# Patient Record
Sex: FEMALE | Race: WHITE | NOT HISPANIC OR LATINO | Employment: UNEMPLOYED | URBAN - NONMETROPOLITAN AREA
[De-identification: names, ages, dates, MRNs, and addresses within clinical notes are randomized per-mention and may not be internally consistent; named-entity substitution may affect disease eponyms.]

---

## 2017-02-10 ENCOUNTER — HOSPITAL ENCOUNTER (INPATIENT)
Facility: HOSPITAL | Age: 42
LOS: 2 days | Discharge: HOME | End: 2017-02-12
Attending: FAMILY MEDICINE | Admitting: OBSTETRICS & GYNECOLOGY

## 2017-02-10 ENCOUNTER — APPOINTMENT (OUTPATIENT)
Dept: ULTRASOUND IMAGING | Facility: HOSPITAL | Age: 42
End: 2017-02-10

## 2017-02-10 ENCOUNTER — HOSPITAL ENCOUNTER (EMERGENCY)
Facility: HOSPITAL | Age: 42
Discharge: LEFT BEFORE DISCHARGE | End: 2017-02-10
Attending: EMERGENCY MEDICINE | Admitting: NURSE PRACTITIONER

## 2017-02-10 VITALS
SYSTOLIC BLOOD PRESSURE: 135 MMHG | TEMPERATURE: 97.6 F | HEART RATE: 94 BPM | DIASTOLIC BLOOD PRESSURE: 78 MMHG | HEIGHT: 62 IN | RESPIRATION RATE: 16 BRPM | OXYGEN SATURATION: 100 % | BODY MASS INDEX: 24.84 KG/M2 | WEIGHT: 135 LBS

## 2017-02-10 DIAGNOSIS — N93.9 EXCESSIVE VAGINAL BLEEDING: ICD-10-CM

## 2017-02-10 DIAGNOSIS — D62 ACUTE BLOOD LOSS ANEMIA: Primary | ICD-10-CM

## 2017-02-10 DIAGNOSIS — D64.9 ANEMIA, UNSPECIFIED TYPE: Primary | ICD-10-CM

## 2017-02-10 DIAGNOSIS — N92.4 MENORRHAGIA, PREMENOPAUSAL: ICD-10-CM

## 2017-02-10 LAB
ABO GROUP BLD: NORMAL
ALBUMIN SERPL-MCNC: 3.3 G/DL (ref 3.4–4.8)
ALBUMIN/GLOB SERPL: 1.1 G/DL (ref 1.1–1.8)
ALP SERPL-CCNC: 82 U/L (ref 38–126)
ALT SERPL W P-5'-P-CCNC: 26 U/L (ref 9–52)
ANION GAP SERPL CALCULATED.3IONS-SCNC: 7 MMOL/L (ref 5–15)
ANISOCYTOSIS BLD QL: ABNORMAL
APTT PPP: 32.1 SECONDS (ref 20–40.3)
AST SERPL-CCNC: 20 U/L (ref 14–36)
BILIRUB SERPL-MCNC: 0.4 MG/DL (ref 0.2–1.3)
BLD GP AB SCN SERPL QL: NEGATIVE
BUN BLD-MCNC: 11 MG/DL (ref 7–21)
BUN/CREAT SERPL: 20 (ref 7–25)
CALCIUM SPEC-SCNC: 8.6 MG/DL (ref 8.4–10.2)
CHLORIDE SERPL-SCNC: 103 MMOL/L (ref 95–110)
CO2 SERPL-SCNC: 26 MMOL/L (ref 22–31)
CREAT BLD-MCNC: 0.55 MG/DL (ref 0.5–1)
DEPRECATED RDW RBC AUTO: 49 FL (ref 36.4–46.3)
EOSINOPHIL # BLD MANUAL: 0.29 10*3/MM3 (ref 0–0.7)
EOSINOPHIL NFR BLD MANUAL: 3 % (ref 0–7)
ERYTHROCYTE [DISTWIDTH] IN BLOOD BY AUTOMATED COUNT: 17.4 % (ref 11.5–14.5)
GFR SERPL CREATININE-BSD FRML MDRD: 123 ML/MIN/1.73 (ref 60–149)
GLOBULIN UR ELPH-MCNC: 2.9 GM/DL (ref 2.3–3.5)
GLUCOSE BLD-MCNC: 84 MG/DL (ref 60–100)
HCT VFR BLD AUTO: 14.7 % (ref 35–45)
HGB BLD-MCNC: 4.5 G/DL (ref 12–15.5)
HOLD SPECIMEN: NORMAL
HOLD SPECIMEN: NORMAL
HYPOCHROMIA BLD QL: ABNORMAL
LARGE PLATELETS: ABNORMAL
LYMPHOCYTES # BLD MANUAL: 1.74 10*3/MM3 (ref 0.6–4.2)
LYMPHOCYTES NFR BLD MANUAL: 12 % (ref 0–12)
LYMPHOCYTES NFR BLD MANUAL: 18 % (ref 10–50)
Lab: NORMAL
MCH RBC QN AUTO: 23.3 PG (ref 26.5–34)
MCHC RBC AUTO-ENTMCNC: 30.6 G/DL (ref 31.4–36)
MCV RBC AUTO: 76.2 FL (ref 80–98)
MONOCYTES # BLD AUTO: 1.16 10*3/MM3 (ref 0–0.9)
NEUTROPHILS # BLD AUTO: 6.47 10*3/MM3 (ref 2–8.6)
NEUTROPHILS NFR BLD MANUAL: 67 % (ref 37–80)
PLATELET # BLD AUTO: 431 10*3/MM3 (ref 150–450)
PMV BLD AUTO: 7.9 FL (ref 8–12)
POLYCHROMASIA BLD QL SMEAR: ABNORMAL
POTASSIUM BLD-SCNC: 4.2 MMOL/L (ref 3.5–5.1)
PROT SERPL-MCNC: 6.2 G/DL (ref 6.3–8.6)
RBC # BLD AUTO: 1.93 10*6/MM3 (ref 3.77–5.16)
RH BLD: POSITIVE
SMALL PLATELETS BLD QL SMEAR: ADEQUATE
SODIUM BLD-SCNC: 136 MMOL/L (ref 137–145)
WBC MORPH BLD: NORMAL
WBC NRBC COR # BLD: 9.66 10*3/MM3 (ref 3.2–9.8)
WHOLE BLOOD HOLD SPECIMEN: NORMAL

## 2017-02-10 PROCEDURE — 76830 TRANSVAGINAL US NON-OB: CPT

## 2017-02-10 PROCEDURE — 86923 COMPATIBILITY TEST ELECTRIC: CPT

## 2017-02-10 PROCEDURE — 85007 BL SMEAR W/DIFF WBC COUNT: CPT | Performed by: EMERGENCY MEDICINE

## 2017-02-10 PROCEDURE — 86850 RBC ANTIBODY SCREEN: CPT

## 2017-02-10 PROCEDURE — 99284 EMERGENCY DEPT VISIT MOD MDM: CPT

## 2017-02-10 PROCEDURE — 86900 BLOOD TYPING SEROLOGIC ABO: CPT

## 2017-02-10 PROCEDURE — 85025 COMPLETE CBC W/AUTO DIFF WBC: CPT | Performed by: EMERGENCY MEDICINE

## 2017-02-10 PROCEDURE — 36430 TRANSFUSION BLD/BLD COMPNT: CPT

## 2017-02-10 PROCEDURE — 85730 THROMBOPLASTIN TIME PARTIAL: CPT | Performed by: NURSE PRACTITIONER

## 2017-02-10 PROCEDURE — 96374 THER/PROPH/DIAG INJ IV PUSH: CPT

## 2017-02-10 PROCEDURE — 86901 BLOOD TYPING SEROLOGIC RH(D): CPT

## 2017-02-10 PROCEDURE — P9016 RBC LEUKOCYTES REDUCED: HCPCS

## 2017-02-10 PROCEDURE — 80053 COMPREHEN METABOLIC PANEL: CPT | Performed by: NURSE PRACTITIONER

## 2017-02-10 PROCEDURE — 25010000002 ESTROGENS CONJUGATED PER 25 MG: Performed by: FAMILY MEDICINE

## 2017-02-10 RX ORDER — SODIUM CHLORIDE 0.9 % (FLUSH) 0.9 %
10 SYRINGE (ML) INJECTION AS NEEDED
Status: DISCONTINUED | OUTPATIENT
Start: 2017-02-10 | End: 2017-02-10 | Stop reason: HOSPADM

## 2017-02-10 RX ORDER — ACETAMINOPHEN 500 MG
1000 TABLET ORAL ONCE
Status: COMPLETED | OUTPATIENT
Start: 2017-02-10 | End: 2017-02-10

## 2017-02-10 RX ADMIN — CONJUGATED ESTROGENS 25 MG: 25 INJECTION, POWDER, LYOPHILIZED, FOR SOLUTION INTRAMUSCULAR; INTRAVENOUS at 23:59

## 2017-02-10 RX ADMIN — ACETAMINOPHEN 1000 MG: 500 TABLET ORAL at 15:28

## 2017-02-10 RX ADMIN — SODIUM CHLORIDE 1000 ML: 900 INJECTION, SOLUTION INTRAVENOUS at 22:21

## 2017-02-10 NOTE — ED NOTES
Patient reports vaginal bleeding for last 17 days, having dizziness and weakness.     Esha Baker, SOHAN  02/10/17 5029

## 2017-02-10 NOTE — ED PROVIDER NOTES
"Subjective   Patient is a 41 y.o. female presenting with vaginal bleeding.   Vaginal Bleeding   Quality:  Heavier than menses  Severity:  Moderate (states this is 17th day of period. normally period last 5 days.)  Onset quality:  Gradual  Duration:  17 days  Timing:  Constant  Progression:  Waxing and waning  Chronicity:  New  Menstrual history:  Irregular  Number of pads used:  \"several\"  Number of tampons used:  \"several\"  Associated symptoms: fatigue        Review of Systems   Constitutional: Positive for fatigue.   HENT:        Headache   Eyes: Negative.    Respiratory: Negative.    Cardiovascular: Negative.    Gastrointestinal: Negative.    Genitourinary: Positive for vaginal bleeding.   Musculoskeletal: Negative.    Skin: Negative.    Neurological: Negative.    Psychiatric/Behavioral: Negative.        Past Medical History   Diagnosis Date   • Anemia        No Known Allergies    Past Surgical History   Procedure Laterality Date   • Tubal abdominal ligation         History reviewed. No pertinent family history.    Social History     Social History   • Marital status:      Spouse name: N/A   • Number of children: N/A   • Years of education: N/A     Social History Main Topics   • Smoking status: Current Every Day Smoker     Packs/day: 1.00     Types: Cigarettes   • Smokeless tobacco: None   • Alcohol use No   • Drug use: No   • Sexual activity: Not Asked     Other Topics Concern   • None     Social History Narrative           Objective   Physical Exam   Constitutional: She is oriented to person, place, and time. She appears well-developed and well-nourished.   HENT:   Head: Normocephalic.   Eyes: EOM are normal. Pupils are equal, round, and reactive to light.   Conjunctivae pale   Neck: Normal range of motion. Neck supple.   Cardiovascular: Normal rate and regular rhythm.    Pulmonary/Chest: Effort normal and breath sounds normal.   Abdominal: Soft. Bowel sounds are normal.   Neurological: She is alert and " "oriented to person, place, and time.   Skin: Skin is warm and dry. There is pallor.   Nursing note and vitals reviewed.    Visit Vitals   • /78 (BP Location: Left arm, Patient Position: Lying)   • Pulse 94   • Temp 97.6 °F (36.4 °C)   • Resp 16   • Ht 62.25\" (158.1 cm)   • Wt 135 lb (61.2 kg)   • SpO2 100%   • BMI 24.49 kg/m2         Procedures         ED Course  ED Course      Results for orders placed or performed during the hospital encounter of 02/10/17   CBC Auto Differential   Result Value Ref Range    WBC 9.66 3.20 - 9.80 10*3/mm3    RBC 1.93 (L) 3.77 - 5.16 10*6/mm3    Hemoglobin 4.5 (C) 12.0 - 15.5 g/dL    Hematocrit 14.7 (C) 35.0 - 45.0 %    MCV 76.2 (L) 80.0 - 98.0 fL    MCH 23.3 (L) 26.5 - 34.0 pg    MCHC 30.6 (L) 31.4 - 36.0 g/dL    RDW 17.4 (H) 11.5 - 14.5 %    RDW-SD 49.0 (H) 36.4 - 46.3 fl    MPV 7.9 (L) 8.0 - 12.0 fL    Platelets 431 150 - 450 10*3/mm3   Comprehensive Metabolic Panel   Result Value Ref Range    Glucose 84 60 - 100 mg/dL    BUN 11 7 - 21 mg/dL    Creatinine 0.55 0.50 - 1.00 mg/dL    Sodium 136 (L) 137 - 145 mmol/L    Potassium 4.2 3.5 - 5.1 mmol/L    Chloride 103 95 - 110 mmol/L    CO2 26.0 22.0 - 31.0 mmol/L    Calcium 8.6 8.4 - 10.2 mg/dL    Total Protein 6.2 (L) 6.3 - 8.6 g/dL    Albumin 3.30 (L) 3.40 - 4.80 g/dL    ALT (SGPT) 26 9 - 52 U/L    AST (SGOT) 20 14 - 36 U/L    Alkaline Phosphatase 82 38 - 126 U/L    Total Bilirubin 0.4 0.2 - 1.3 mg/dL    eGFR Non African Amer 123 >60 mL/min/1.73    Globulin 2.9 2.3 - 3.5 gm/dL    A/G Ratio 1.1 1.1 - 1.8 g/dL    BUN/Creatinine Ratio 20.0 7.0 - 25.0    Anion Gap 7.0 5.0 - 15.0 mmol/L   aPTT   Result Value Ref Range    PTT 32.1 20.0 - 40.3 seconds   Type & Screen   Result Value Ref Range    ABO Type O     RH type Positive     Antibody Screen Negative    PREVIOUS HISTORY   Result Value Ref Range    Previous History Previous Record on File    Green Top (Gel)   Result Value Ref Range    Extra Tube Hold for add-ons.    Lavender Top "   Result Value Ref Range    Extra Tube hold for add-on    Gold Top - SST   Result Value Ref Range    Extra Tube Hold for add-ons.    Manual Differential   Result Value Ref Range    Neutrophil % 67.0 37.0 - 80.0 %    Lymphocyte % 18.0 10.0 - 50.0 %    Monocyte % 12.0 0.0 - 12.0 %    Eosinophil % 3.0 0.0 - 7.0 %    Neutrophils Absolute 6.47 2.00 - 8.60 10*3/mm3    Lymphocytes Absolute 1.74 0.60 - 4.20 10*3/mm3    Monocytes Absolute 1.16 (H) 0.00 - 0.90 10*3/mm3    Eosinophils Absolute 0.29 0.00 - 0.70 10*3/mm3    Anisocytosis Slight/1+ None Seen    Hypochromia Mod/2+ None Seen    Polychromasia Slight/1+ None Seen    WBC Morphology Normal Normal    Platelet Estimate Adequate Normal    Large Platelets Slight/1+ None Seen                   MDM  Number of Diagnoses or Management Options  Anemia, unspecified type:   Diagnosis management comments: Hgb 4.5. Lab had just redrawn, still waiting the results, When she received phone call. States her kids are being taken from her and she cannot stay. Advised labs are dangerously low. Critical. Refused to stay. Leaving AMA. Advised to return when soon as possible.      Final diagnoses:   Anemia, unspecified type            Tonja Boss, APRN  02/10/17 3042

## 2017-02-11 PROBLEM — N92.4 MENORRHAGIA, PREMENOPAUSAL: Status: ACTIVE | Noted: 2017-02-11

## 2017-02-11 PROBLEM — Z72.0 TOBACCO USE: Status: ACTIVE | Noted: 2017-02-11

## 2017-02-11 LAB
DEPRECATED RDW RBC AUTO: 50 FL (ref 36.4–46.3)
ERYTHROCYTE [DISTWIDTH] IN BLOOD BY AUTOMATED COUNT: 17.1 % (ref 11.5–14.5)
HCT VFR BLD AUTO: 29.5 % (ref 35–45)
HGB BLD-MCNC: 10 G/DL (ref 12–15.5)
MCH RBC QN AUTO: 27.2 PG (ref 26.5–34)
MCHC RBC AUTO-ENTMCNC: 33.9 G/DL (ref 31.4–36)
MCV RBC AUTO: 80.4 FL (ref 80–98)
PLATELET # BLD AUTO: 346 10*3/MM3 (ref 150–450)
PMV BLD AUTO: 8.5 FL (ref 8–12)
RBC # BLD AUTO: 3.67 10*6/MM3 (ref 3.77–5.16)
WBC NRBC COR # BLD: 11.73 10*3/MM3 (ref 3.2–9.8)

## 2017-02-11 PROCEDURE — P9016 RBC LEUKOCYTES REDUCED: HCPCS

## 2017-02-11 PROCEDURE — 86900 BLOOD TYPING SEROLOGIC ABO: CPT

## 2017-02-11 PROCEDURE — 85027 COMPLETE CBC AUTOMATED: CPT | Performed by: OBSTETRICS & GYNECOLOGY

## 2017-02-11 PROCEDURE — 58260 VAGINAL HYSTERECTOMY: CPT | Performed by: OBSTETRICS & GYNECOLOGY

## 2017-02-11 PROCEDURE — 25010000002 DIPHENHYDRAMINE PER 50 MG: Performed by: OBSTETRICS & GYNECOLOGY

## 2017-02-11 PROCEDURE — 25010000002 ESTROGENS CONJUGATED PER 25 MG: Performed by: OBSTETRICS & GYNECOLOGY

## 2017-02-11 PROCEDURE — 25010000002 METOCLOPRAMIDE PER 10 MG: Performed by: OBSTETRICS & GYNECOLOGY

## 2017-02-11 PROCEDURE — 99222 1ST HOSP IP/OBS MODERATE 55: CPT | Performed by: OBSTETRICS & GYNECOLOGY

## 2017-02-11 RX ORDER — HYDROXYZINE PAMOATE 50 MG/1
50 CAPSULE ORAL EVERY 6 HOURS PRN
Status: DISCONTINUED | OUTPATIENT
Start: 2017-02-11 | End: 2017-02-12 | Stop reason: HOSPADM

## 2017-02-11 RX ORDER — FERROUS SULFATE TAB EC 324 MG (65 MG FE EQUIVALENT) 324 (65 FE) MG
65 TABLET DELAYED RESPONSE ORAL
COMMUNITY
End: 2021-05-31

## 2017-02-11 RX ORDER — DEXAMETHASONE SODIUM PHOSPHATE 10 MG/ML
10 INJECTION, SOLUTION INTRAMUSCULAR; INTRAVENOUS ONCE
Status: DISCONTINUED | OUTPATIENT
Start: 2017-02-11 | End: 2017-02-12 | Stop reason: HOSPADM

## 2017-02-11 RX ORDER — METOCLOPRAMIDE HYDROCHLORIDE 5 MG/ML
10 INJECTION INTRAMUSCULAR; INTRAVENOUS ONCE
Status: COMPLETED | OUTPATIENT
Start: 2017-02-11 | End: 2017-02-11

## 2017-02-11 RX ORDER — ACETAMINOPHEN 500 MG
TABLET ORAL
Status: DISCONTINUED
Start: 2017-02-11 | End: 2017-02-11 | Stop reason: WASHOUT

## 2017-02-11 RX ORDER — ACETAMINOPHEN 325 MG/1
TABLET ORAL
Status: COMPLETED
Start: 2017-02-11 | End: 2017-02-11

## 2017-02-11 RX ORDER — DIPHENHYDRAMINE HYDROCHLORIDE 50 MG/ML
25 INJECTION INTRAMUSCULAR; INTRAVENOUS EVERY 6 HOURS PRN
Status: DISCONTINUED | OUTPATIENT
Start: 2017-02-11 | End: 2017-02-12 | Stop reason: HOSPADM

## 2017-02-11 RX ORDER — SODIUM CHLORIDE, SODIUM LACTATE, POTASSIUM CHLORIDE, CALCIUM CHLORIDE 600; 310; 30; 20 MG/100ML; MG/100ML; MG/100ML; MG/100ML
100 INJECTION, SOLUTION INTRAVENOUS CONTINUOUS
Status: DISCONTINUED | OUTPATIENT
Start: 2017-02-11 | End: 2017-02-12 | Stop reason: HOSPADM

## 2017-02-11 RX ORDER — ACETAMINOPHEN 325 MG/1
650 TABLET ORAL EVERY 4 HOURS PRN
Status: DISCONTINUED | OUTPATIENT
Start: 2017-02-11 | End: 2017-02-12 | Stop reason: HOSPADM

## 2017-02-11 RX ADMIN — METOCLOPRAMIDE 10 MG: 5 INJECTION, SOLUTION INTRAMUSCULAR; INTRAVENOUS at 17:05

## 2017-02-11 RX ADMIN — ACETAMINOPHEN 650 MG: 325 TABLET ORAL at 14:52

## 2017-02-11 RX ADMIN — DIPHENHYDRAMINE HYDROCHLORIDE 25 MG: 50 INJECTION INTRAMUSCULAR; INTRAVENOUS at 17:05

## 2017-02-11 RX ADMIN — CONJUGATED ESTROGENS 25 MG: 25 INJECTION, POWDER, LYOPHILIZED, FOR SOLUTION INTRAMUSCULAR; INTRAVENOUS at 13:39

## 2017-02-11 RX ADMIN — SODIUM CHLORIDE, POTASSIUM CHLORIDE, SODIUM LACTATE AND CALCIUM CHLORIDE 100 ML/HR: 600; 310; 30; 20 INJECTION, SOLUTION INTRAVENOUS at 23:30

## 2017-02-11 RX ADMIN — ACETAMINOPHEN 650 MG: 325 TABLET ORAL at 07:25

## 2017-02-11 RX ADMIN — HYDROXYZINE PAMOATE 50 MG: 50 CAPSULE ORAL at 15:26

## 2017-02-11 NOTE — H&P
Patient Care Team:  No Known Provider as PCP - General    Chief complaint HEAVY VAGINAL BLEEDING    Subjective     Patient is a 41 y.o. female  presents with a heavy period and feeling weak and dizzy.  She normally has regular menstrual periods but skipped a period in December.  When her period started in late January, it wouldn't stop.  She presented to the ED with these symptoms and was found to have a severe anemia with a hemoglobin of 4.5.  She was given a dose of IV premarin and her bleeding is lighter.  She is currently receiving the third unit of blood.  She is feeling better and is asking about going home.               History  Past Medical History   Diagnosis Date   • Anemia      Past Surgical History   Procedure Laterality Date   • Tubal abdominal ligation       History reviewed. No pertinent family history.  Social History   Substance Use Topics   • Smoking status: Current Every Day Smoker     Packs/day: 1.00     Types: Cigarettes   • Smokeless tobacco: None   • Alcohol use No     Prescriptions Prior to Admission   Medication Sig Dispense Refill Last Dose   • ferrous sulfate 324 (65 FE) MG tablet delayed-release EC tablet Take 65 mg by mouth Daily With Breakfast.   2/10/2017 at Unknown time     Allergies:  Review of patient's allergies indicates no known allergies.    Review of Systems - General ROS: positive for  - fatigue and sleep disturbance  Psychological ROS: negative  Ophthalmic ROS: negative for - blurry vision  ENT ROS: negative for - nasal congestion or sore throat  Allergy and Immunology ROS: negative for - itchy/watery eyes  Hematological and Lymphatic ROS: positive for - bleeding problems and pallor  Endocrine ROS: positive for - malaise/lethargy and palpitations  Breast ROS: negative for breast lumps  Respiratory ROS: positive for - shortness of breath  Cardiovascular ROS: no chest pain or dyspnea on exertion  Gastrointestinal ROS: no abdominal pain, change in bowel habits, or black  or bloody stools  Genito-Urinary ROS: no dysuria, trouble voiding, or hematuria  Musculoskeletal ROS: negative for - gait disturbance  Neurological ROS: no TIA or stroke symptoms  Dermatological ROS: negative for rash    Objective     Vital Signs  Temp:  [97.6 °F (36.4 °C)-98.6 °F (37 °C)] 98.6 °F (37 °C)  Heart Rate:  [] 85  Resp:  [16-20] 20  BP: (111-163)/(62-98) 144/85    Physical Exam:      General Appearance:    Alert, cooperative, in no acute distress, pale in appearance   Head:    Normocephalic, without obvious abnormality, atraumatic   Eyes:            Lids and lashes normal, conjunctivae and sclerae normal, no   icterus, corneas clear, PERRLA   Ears:    Ears appear intact with no abnormalities noted   Throat:   No oral lesions, no thrush, oral mucosa moist   Neck:   No adenopathy, supple, trachea midline, no thyromegaly, no     carotid bruit, no JVD   Back:     No kyphosis present, no scoliosis present, no skin lesions,       erythema or scars, no tenderness to percussion or                   palpation,   range of motion normal   Lungs:     Clear to auscultation,respirations regular, even and                   unlabored    Heart:    Regular rhythm and normal rate, normal S1 and S2, no            murmur, no gallop, no rub, no click   Breast Exam:    Deferred   Abdomen:     Normal bowel sounds, no masses, no organomegaly, soft        non-tender, non-distended, no guarding, no rebound                 tenderness   Genitalia:    Deferred   Extremities:   Moves all extremities well, no edema, no cyanosis, no              redness   Pulses:   Pulses palpable and equal bilaterally   Skin:   No bleeding, bruising or rash   Lymph nodes:   No palpable adenopathy   Neurologic:   Cranial nerves 2 - 12 grossly intact       Results Review:    I reviewed the patient's new clinical results.    Assessment/Plan     Active Problems:    Acute blood loss anemia    Menorrhagia, premenopausal    Tobacco use      I discussed  the patients findings and my recommendations with patient.   We have discussed the severity of her situation and that discharge home is not a reasonable request.  She will agree to finishing the transfusion.  We will repeat the premarin and likely convert her over to oral estrogen/progesterone until we can proceed with something more permanent.  She is asking about a hysterectomy but at this point, as she is stable, I would recommend improving the hemoglobin and stopping the bleeding before proceeding.  She is agreeable to that.    Tish Dan MD  02/11/17  8:40 AM    Time: 45 minutes

## 2017-02-11 NOTE — PROGRESS NOTES
Fadia and I discussed options for her significant anemia and menorrhagia and she wants to proceed with hysterectomy.  As she has had 4 vaginal deliveries, would recommend a vaginal hysterectomy.  Her uterus is enlarged but I feel that she is a good candidate.    Today I discussed with Fadia the risks of her upcoming surgical procedure. Risks including intraoperative bleeding, infection at the site of surgery, damage to the surrounding organs, catheter introduced urinary tract infections and the small risk for deep vein thrombosis were all explained. Additionally, the small risk for reoperation in the event of unanticipated bleeding or surgical injury was discussed.  All of her questions were answered fully.  She has very clear understanding of the preoperative surgical indications and the nature of the surgery for which she is scheduled.  She is also aware of the possibility of blood transfusion.

## 2017-02-11 NOTE — ED PROVIDER NOTES
"Subjective   Patient is a 41 y.o. female presenting with vaginal bleeding.   Vaginal Bleeding   Quality:  Bright red  Severity:  Moderate  Onset quality:  Sudden  Duration:  2 weeks  Timing:  Intermittent  Progression:  Worsening  Chronicity:  New  Menstrual history:  Irregular  Number of tampons used:  3 / day   Possible pregnancy: no    Context: not after intercourse, not after urination, not at rest and not genital trauma    Relieved by:  Nothing  Ineffective treatments:  None tried  Associated symptoms: fatigue    Associated symptoms: no abdominal pain, no back pain, no dizziness, no dyspareunia, no dysuria, no fever, no nausea and no vaginal discharge    Risk factors: ovarian cysts    Risk factors: no bleeding disorder        Review of Systems   Constitutional: Positive for fatigue. Negative for fever.   Gastrointestinal: Negative for abdominal pain and nausea.   Genitourinary: Positive for vaginal bleeding. Negative for dyspareunia, dysuria and vaginal discharge.   Musculoskeletal: Negative for back pain.   Neurological: Negative for dizziness.       Past Medical History   Diagnosis Date   • Anemia        No Known Allergies    Past Surgical History   Procedure Laterality Date   • Tubal abdominal ligation         No family history on file.    Social History     Social History   • Marital status:      Spouse name: N/A   • Number of children: N/A   • Years of education: N/A     Social History Main Topics   • Smoking status: Current Every Day Smoker     Packs/day: 1.00     Types: Cigarettes   • Smokeless tobacco: None   • Alcohol use No   • Drug use: No   • Sexual activity: Not Asked     Other Topics Concern   • None     Social History Narrative           Objective    Visit Vitals   • /74 (BP Location: Left arm, Patient Position: Lying)   • Pulse 98   • Temp 98.4 °F (36.9 °C) (Oral)   • Resp 18   • Ht 62\" (157.5 cm)   • Wt 135 lb (61.2 kg)   • SpO2 99%   • BMI 24.69 kg/m2       Physical Exam "   Constitutional: She is oriented to person, place, and time. She appears well-developed and well-nourished.   HENT:   Head: Normocephalic and atraumatic.   Right Ear: External ear normal.   Left Ear: External ear normal.   Nose: Nose normal.   Mouth/Throat: Oropharynx is clear and moist.   Eyes: Conjunctivae and EOM are normal. Pupils are equal, round, and reactive to light.   Neck: Normal range of motion. Neck supple.   Cardiovascular: Normal rate, regular rhythm, normal heart sounds and intact distal pulses.    Pulmonary/Chest: Effort normal and breath sounds normal. No accessory muscle usage. No respiratory distress. She exhibits no tenderness and no deformity.   Abdominal: Soft. Bowel sounds are normal. There is no tenderness.   Musculoskeletal: Normal range of motion.   Neurological: She is alert and oriented to person, place, and time. She has normal reflexes.   Skin: Skin is warm.   Psychiatric: She has a normal mood and affect. Her behavior is normal. Judgment and thought content normal.   Nursing note and vitals reviewed.      Procedures         ED Course  ED Course      Labs Reviewed   PREGNANCY, URINE   URINALYSIS W/ CULTURE IF INDICATED   PREPARE RBC   PREPARE RBC     Us Non-ob Transvaginal    Result Date: 2/11/2017  Narrative: [facility] [place] Patient Name: EPIFANIO BECKER ORDERING: PRASANNA NORTON ATTENDING: PRASANNA NORTON REFERRING: PRASANNA NORTON----------------------- PROCEDURE: Ultrasound pelvis transvaginal on 2/11/2017 CLINICAL INDICATION:  Heavy vaginal bleeding COMPARISON: None FINDINGS: Multiple sonographic images are obtained throughout the pelvis by transvaginal approach, both transverse and sagittal images are obtained. Uterus measures approximately 9.6 x 4.9 x 6.2 cm. Very small amount of fluid is noted in the endometrial canal. Tiny nabothian cyst is noted in the cervix. There is a 3.4 x 2.8 x 3.7 cm simple left ovarian cyst. This should be considered almost certainly  benign with no follow-up recommended. Left ovary measures approximately 4.2 x 3.1 x 4.4 cm. Flow is demonstrated in the left ovary. There are two adjacent dominant follicles in the right ovary with the largest measuring 2.4 cm. This also should be considered benign with no follow-up recommended. Right ovary measures approximately 3.3 x 2.9 x 2.3 cm. No adnexal mass or fluid collection is noted. No free fluid is noted. Endometrial stripe measures approximately 8 mm which is within normal limits. Uterine myometrium appears homogeneous.     Impression: CONCLUSION: Essentially unremarkable exam. Electronically signed by:  Marcus Escalera  2/11/2017 12:40 AM CST Workstation: [id number]      Severe symptomatic anemia after vaginal bleeding .   Risk and benefits discussed with pt and orders initiated transfusion .             MDM  Number of Diagnoses or Management Options  Acute blood loss anemia: new and requires workup  Excessive vaginal bleeding: new and requires workup     Amount and/or Complexity of Data Reviewed  Clinical lab tests: ordered and reviewed  Tests in the medicine section of CPT®: ordered and reviewed  Review and summarize past medical records: yes  Discuss the patient with other providers: yes    Risk of Complications, Morbidity, and/or Mortality  Presenting problems: moderate  Management options: high  General comments: Acute blood loss anemia - transfusion ordered.         Final diagnoses:   Acute blood loss anemia   Excessive vaginal bleeding            Lizzie Burnett MD  02/11/17 0155

## 2017-02-11 NOTE — PLAN OF CARE
Problem: Patient Care Overview (Adult)  Goal: Plan of Care Review  Outcome: Ongoing (interventions implemented as appropriate)    02/11/17 0311   Coping/Psychosocial Response Interventions   Plan Of Care Reviewed With patient   Patient Care Overview   Progress no change       Goal: Adult Individualization and Mutuality  Outcome: Ongoing (interventions implemented as appropriate)  Goal: Discharge Needs Assessment  Outcome: Ongoing (interventions implemented as appropriate)    Problem: Fluid Volume Deficit (Adult)  Goal: Identify Related Risk Factors and Signs and Symptoms  Outcome: Ongoing (interventions implemented as appropriate)  Goal: Fluid/Electrolyte Balance  Outcome: Ongoing (interventions implemented as appropriate)  Goal: Comfort/Well Being  Outcome: Ongoing (interventions implemented as appropriate)

## 2017-02-11 NOTE — PLAN OF CARE
Problem: Patient Care Overview (Adult)  Goal: Plan of Care Review  Outcome: Ongoing (interventions implemented as appropriate)    02/11/17 1747   Coping/Psychosocial Response Interventions   Plan Of Care Reviewed With patient   Outcome Evaluation   Outcome Summary/Follow up Plan surgery in a/m per nikhil Vásquez at 2300 sa       02/11/17 1747   Coping/Psychosocial Response Interventions   Plan Of Care Reviewed With patient   Outcome Evaluation   Outcome Summary/Follow up Plan surgery in a/m per nikhil Vásquez at 2300 saline lock   line lock       Goal: Discharge Needs Assessment  Outcome: Ongoing (interventions implemented as appropriate)    Problem: Fluid Volume Deficit (Adult)  Goal: Fluid/Electrolyte Balance  Outcome: Ongoing (interventions implemented as appropriate)  Goal: Comfort/Well Being  Outcome: Ongoing (interventions implemented as appropriate)

## 2017-02-12 ENCOUNTER — ANESTHESIA EVENT (OUTPATIENT)
Dept: PERIOP | Facility: HOSPITAL | Age: 42
End: 2017-02-12

## 2017-02-12 ENCOUNTER — ANESTHESIA (OUTPATIENT)
Dept: PERIOP | Facility: HOSPITAL | Age: 42
End: 2017-02-12

## 2017-02-12 VITALS
OXYGEN SATURATION: 98 % | WEIGHT: 135 LBS | HEART RATE: 82 BPM | BODY MASS INDEX: 24.84 KG/M2 | DIASTOLIC BLOOD PRESSURE: 99 MMHG | RESPIRATION RATE: 20 BRPM | SYSTOLIC BLOOD PRESSURE: 148 MMHG | TEMPERATURE: 97.9 F | HEIGHT: 62 IN

## 2017-02-12 PROBLEM — N92.4 MENORRHAGIA, PREMENOPAUSAL: Status: RESOLVED | Noted: 2017-02-11 | Resolved: 2017-02-12

## 2017-02-12 LAB
ABO + RH BLD: NORMAL
BH BB BLOOD EXPIRATION DATE: NORMAL
BH BB BLOOD TYPE BARCODE: 5100
BH BB DISPENSE STATUS: NORMAL
BH BB PRODUCT CODE: NORMAL
BH BB UNIT NUMBER: NORMAL
HCT VFR BLD AUTO: 28.2 % (ref 35–45)
HGB BLD-MCNC: 9.4 G/DL (ref 12–15.5)
UNIT  ABO: NORMAL
UNIT  RH: NORMAL

## 2017-02-12 PROCEDURE — 25010000002 ONDANSETRON PER 1 MG: Performed by: ANESTHESIOLOGY

## 2017-02-12 PROCEDURE — 25010000002 MIDAZOLAM PER 1 MG: Performed by: ANESTHESIOLOGY

## 2017-02-12 PROCEDURE — 25010000002 HYDROMORPHONE PER 4 MG

## 2017-02-12 PROCEDURE — 25010000002 HYDROMORPHONE PER 4 MG: Performed by: ANESTHESIOLOGY

## 2017-02-12 PROCEDURE — 25010000003 CEFAZOLIN PER 500 MG: Performed by: ANESTHESIOLOGY

## 2017-02-12 PROCEDURE — 85014 HEMATOCRIT: CPT | Performed by: OBSTETRICS & GYNECOLOGY

## 2017-02-12 PROCEDURE — 88307 TISSUE EXAM BY PATHOLOGIST: CPT | Performed by: OBSTETRICS & GYNECOLOGY

## 2017-02-12 PROCEDURE — 25010000002 PROMETHAZINE PER 50 MG: Performed by: OBSTETRICS & GYNECOLOGY

## 2017-02-12 PROCEDURE — 25010000002 FENTANYL CITRATE (PF) 100 MCG/2ML SOLUTION: Performed by: ANESTHESIOLOGY

## 2017-02-12 PROCEDURE — 99024 POSTOP FOLLOW-UP VISIT: CPT | Performed by: OBSTETRICS & GYNECOLOGY

## 2017-02-12 PROCEDURE — 0UT97ZZ RESECTION OF UTERUS, VIA NATURAL OR ARTIFICIAL OPENING: ICD-10-PCS | Performed by: OBSTETRICS & GYNECOLOGY

## 2017-02-12 PROCEDURE — 25010000002 PROPOFOL 10 MG/ML EMULSION: Performed by: ANESTHESIOLOGY

## 2017-02-12 PROCEDURE — 85018 HEMOGLOBIN: CPT | Performed by: OBSTETRICS & GYNECOLOGY

## 2017-02-12 PROCEDURE — 0UTC7ZZ RESECTION OF CERVIX, VIA NATURAL OR ARTIFICIAL OPENING: ICD-10-PCS | Performed by: OBSTETRICS & GYNECOLOGY

## 2017-02-12 RX ORDER — NALOXONE HCL 0.4 MG/ML
0.2 VIAL (ML) INJECTION AS NEEDED
Status: DISCONTINUED | OUTPATIENT
Start: 2017-02-12 | End: 2017-02-12 | Stop reason: HOSPADM

## 2017-02-12 RX ORDER — ACETAMINOPHEN 325 MG/1
650 TABLET ORAL ONCE AS NEEDED
Status: DISCONTINUED | OUTPATIENT
Start: 2017-02-12 | End: 2017-02-12 | Stop reason: HOSPADM

## 2017-02-12 RX ORDER — HYDROCODONE BITARTRATE AND ACETAMINOPHEN 7.5; 325 MG/1; MG/1
1 TABLET ORAL EVERY 4 HOURS PRN
Qty: 40 TABLET | Refills: 0 | Status: SHIPPED | OUTPATIENT
Start: 2017-02-12 | End: 2017-02-22

## 2017-02-12 RX ORDER — ONDANSETRON 4 MG/1
4 TABLET, FILM COATED ORAL EVERY 6 HOURS PRN
Status: DISCONTINUED | OUTPATIENT
Start: 2017-02-12 | End: 2017-02-12 | Stop reason: HOSPADM

## 2017-02-12 RX ORDER — ONDANSETRON 2 MG/ML
INJECTION INTRAMUSCULAR; INTRAVENOUS AS NEEDED
Status: DISCONTINUED | OUTPATIENT
Start: 2017-02-12 | End: 2017-02-12 | Stop reason: SURG

## 2017-02-12 RX ORDER — FLUMAZENIL 0.1 MG/ML
0.2 INJECTION INTRAVENOUS AS NEEDED
Status: DISCONTINUED | OUTPATIENT
Start: 2017-02-12 | End: 2017-02-12 | Stop reason: HOSPADM

## 2017-02-12 RX ORDER — PROMETHAZINE HYDROCHLORIDE 25 MG/ML
12.5 INJECTION, SOLUTION INTRAMUSCULAR; INTRAVENOUS EVERY 6 HOURS PRN
Status: DISCONTINUED | OUTPATIENT
Start: 2017-02-12 | End: 2017-02-12 | Stop reason: HOSPADM

## 2017-02-12 RX ORDER — SODIUM CHLORIDE, SODIUM GLUCONATE, SODIUM ACETATE, POTASSIUM CHLORIDE, AND MAGNESIUM CHLORIDE 526; 502; 368; 37; 30 MG/100ML; MG/100ML; MG/100ML; MG/100ML; MG/100ML
INJECTION, SOLUTION INTRAVENOUS CONTINUOUS PRN
Status: DISCONTINUED | OUTPATIENT
Start: 2017-02-12 | End: 2017-02-12 | Stop reason: SURG

## 2017-02-12 RX ORDER — MIDAZOLAM HYDROCHLORIDE 1 MG/ML
INJECTION INTRAMUSCULAR; INTRAVENOUS AS NEEDED
Status: DISCONTINUED | OUTPATIENT
Start: 2017-02-12 | End: 2017-02-12 | Stop reason: SURG

## 2017-02-12 RX ORDER — CEFAZOLIN SODIUM 1 G/3ML
INJECTION, POWDER, FOR SOLUTION INTRAMUSCULAR; INTRAVENOUS AS NEEDED
Status: DISCONTINUED | OUTPATIENT
Start: 2017-02-12 | End: 2017-02-12 | Stop reason: SURG

## 2017-02-12 RX ORDER — ONDANSETRON 2 MG/ML
4 INJECTION INTRAMUSCULAR; INTRAVENOUS EVERY 6 HOURS PRN
Status: DISCONTINUED | OUTPATIENT
Start: 2017-02-12 | End: 2017-02-12 | Stop reason: SDUPTHER

## 2017-02-12 RX ORDER — HYDROCODONE BITARTRATE AND ACETAMINOPHEN 7.5; 325 MG/1; MG/1
1 TABLET ORAL EVERY 4 HOURS PRN
Status: DISCONTINUED | OUTPATIENT
Start: 2017-02-12 | End: 2017-02-12 | Stop reason: HOSPADM

## 2017-02-12 RX ORDER — FENTANYL CITRATE 50 UG/ML
INJECTION, SOLUTION INTRAMUSCULAR; INTRAVENOUS AS NEEDED
Status: DISCONTINUED | OUTPATIENT
Start: 2017-02-12 | End: 2017-02-12 | Stop reason: SURG

## 2017-02-12 RX ORDER — BUPIVACAINE HYDROCHLORIDE AND EPINEPHRINE 5; 5 MG/ML; UG/ML
INJECTION, SOLUTION EPIDURAL; INTRACAUDAL; PERINEURAL AS NEEDED
Status: DISCONTINUED | OUTPATIENT
Start: 2017-02-12 | End: 2017-02-12 | Stop reason: HOSPADM

## 2017-02-12 RX ORDER — ACETAMINOPHEN 650 MG/1
650 SUPPOSITORY RECTAL ONCE AS NEEDED
Status: DISCONTINUED | OUTPATIENT
Start: 2017-02-12 | End: 2017-02-12 | Stop reason: HOSPADM

## 2017-02-12 RX ORDER — ROCURONIUM BROMIDE 10 MG/ML
INJECTION, SOLUTION INTRAVENOUS AS NEEDED
Status: DISCONTINUED | OUTPATIENT
Start: 2017-02-12 | End: 2017-02-12 | Stop reason: SURG

## 2017-02-12 RX ORDER — ONDANSETRON 2 MG/ML
4 INJECTION INTRAMUSCULAR; INTRAVENOUS ONCE AS NEEDED
Status: DISCONTINUED | OUTPATIENT
Start: 2017-02-12 | End: 2017-02-12 | Stop reason: HOSPADM

## 2017-02-12 RX ORDER — BUPIVACAINE HYDROCHLORIDE AND EPINEPHRINE 5; 5 MG/ML; UG/ML
INJECTION, SOLUTION EPIDURAL; INTRACAUDAL; PERINEURAL
Status: DISCONTINUED
Start: 2017-02-12 | End: 2017-02-12 | Stop reason: HOSPADM

## 2017-02-12 RX ORDER — LABETALOL HYDROCHLORIDE 5 MG/ML
5 INJECTION, SOLUTION INTRAVENOUS
Status: DISCONTINUED | OUTPATIENT
Start: 2017-02-12 | End: 2017-02-12 | Stop reason: HOSPADM

## 2017-02-12 RX ORDER — DIPHENHYDRAMINE HYDROCHLORIDE 50 MG/ML
12.5 INJECTION INTRAMUSCULAR; INTRAVENOUS
Status: DISCONTINUED | OUTPATIENT
Start: 2017-02-12 | End: 2017-02-12 | Stop reason: HOSPADM

## 2017-02-12 RX ORDER — HYDROXYZINE PAMOATE 50 MG/1
50 CAPSULE ORAL EVERY 6 HOURS PRN
Qty: 20 CAPSULE | Refills: 0 | OUTPATIENT
Start: 2017-02-12 | End: 2021-05-31

## 2017-02-12 RX ORDER — HYDRALAZINE HYDROCHLORIDE 20 MG/ML
5 INJECTION INTRAMUSCULAR; INTRAVENOUS
Status: DISCONTINUED | OUTPATIENT
Start: 2017-02-12 | End: 2017-02-12 | Stop reason: HOSPADM

## 2017-02-12 RX ORDER — METOCLOPRAMIDE HYDROCHLORIDE 5 MG/ML
10 INJECTION INTRAMUSCULAR; INTRAVENOUS ONCE AS NEEDED
Status: DISCONTINUED | OUTPATIENT
Start: 2017-02-12 | End: 2017-02-12 | Stop reason: HOSPADM

## 2017-02-12 RX ORDER — LIDOCAINE HYDROCHLORIDE 20 MG/ML
INJECTION, SOLUTION INFILTRATION; PERINEURAL AS NEEDED
Status: DISCONTINUED | OUTPATIENT
Start: 2017-02-12 | End: 2017-02-12 | Stop reason: SURG

## 2017-02-12 RX ORDER — PROPOFOL 10 MG/ML
VIAL (ML) INTRAVENOUS AS NEEDED
Status: DISCONTINUED | OUTPATIENT
Start: 2017-02-12 | End: 2017-02-12 | Stop reason: SURG

## 2017-02-12 RX ORDER — SODIUM CHLORIDE, SODIUM LACTATE, POTASSIUM CHLORIDE, CALCIUM CHLORIDE 600; 310; 30; 20 MG/100ML; MG/100ML; MG/100ML; MG/100ML
100 INJECTION, SOLUTION INTRAVENOUS CONTINUOUS
Status: DISCONTINUED | OUTPATIENT
Start: 2017-02-12 | End: 2017-02-12 | Stop reason: HOSPADM

## 2017-02-12 RX ORDER — ONDANSETRON 4 MG/1
4 TABLET, ORALLY DISINTEGRATING ORAL EVERY 6 HOURS PRN
Status: DISCONTINUED | OUTPATIENT
Start: 2017-02-12 | End: 2017-02-12 | Stop reason: HOSPADM

## 2017-02-12 RX ADMIN — HYDROMORPHONE HYDROCHLORIDE 0.5 MG: 1 INJECTION, SOLUTION INTRAMUSCULAR; INTRAVENOUS; SUBCUTANEOUS at 09:35

## 2017-02-12 RX ADMIN — HYDROMORPHONE HYDROCHLORIDE 0.5 MG: 1 INJECTION, SOLUTION INTRAMUSCULAR; INTRAVENOUS; SUBCUTANEOUS at 11:28

## 2017-02-12 RX ADMIN — HYDROCODONE BITARTRATE AND ACETAMINOPHEN 1 TABLET: 7.5; 325 TABLET ORAL at 11:09

## 2017-02-12 RX ADMIN — LIDOCAINE HYDROCHLORIDE 60 MG: 20 INJECTION, SOLUTION INFILTRATION; PERINEURAL at 07:19

## 2017-02-12 RX ADMIN — SODIUM CHLORIDE, SODIUM GLUCONATE, SODIUM ACETATE, POTASSIUM CHLORIDE, AND MAGNESIUM CHLORIDE: 526; 502; 368; 37; 30 INJECTION, SOLUTION INTRAVENOUS at 08:45

## 2017-02-12 RX ADMIN — PROPOFOL 150 MG: 10 INJECTION, EMULSION INTRAVENOUS at 07:19

## 2017-02-12 RX ADMIN — ACETAMINOPHEN 650 MG: 325 TABLET ORAL at 03:04

## 2017-02-12 RX ADMIN — FENTANYL CITRATE 100 MCG: 50 INJECTION, SOLUTION INTRAMUSCULAR; INTRAVENOUS at 07:25

## 2017-02-12 RX ADMIN — ROCURONIUM BROMIDE 30 MG: 10 INJECTION INTRAVENOUS at 07:19

## 2017-02-12 RX ADMIN — SODIUM CHLORIDE, SODIUM GLUCONATE, SODIUM ACETATE, POTASSIUM CHLORIDE, AND MAGNESIUM CHLORIDE: 526; 502; 368; 37; 30 INJECTION, SOLUTION INTRAVENOUS at 07:00

## 2017-02-12 RX ADMIN — Medication 0.5 MG: at 11:28

## 2017-02-12 RX ADMIN — PROMETHAZINE HYDROCHLORIDE 12.5 MG: 25 INJECTION INTRAMUSCULAR; INTRAVENOUS at 13:38

## 2017-02-12 RX ADMIN — FENTANYL CITRATE 50 MCG: 50 INJECTION, SOLUTION INTRAMUSCULAR; INTRAVENOUS at 07:19

## 2017-02-12 RX ADMIN — HYDROCODONE BITARTRATE AND ACETAMINOPHEN 1 TABLET: 7.5; 325 TABLET ORAL at 15:18

## 2017-02-12 RX ADMIN — HYDROMORPHONE HYDROCHLORIDE 0.5 MG: 1 INJECTION, SOLUTION INTRAMUSCULAR; INTRAVENOUS; SUBCUTANEOUS at 09:45

## 2017-02-12 RX ADMIN — ONDANSETRON 4 MG: 2 INJECTION INTRAMUSCULAR; INTRAVENOUS at 07:15

## 2017-02-12 RX ADMIN — CEFAZOLIN SODIUM 2 G: 1 INJECTION, POWDER, FOR SOLUTION INTRAMUSCULAR; INTRAVENOUS at 07:20

## 2017-02-12 RX ADMIN — MIDAZOLAM 2 MG: 1 INJECTION INTRAMUSCULAR; INTRAVENOUS at 07:14

## 2017-02-12 RX ADMIN — FENTANYL CITRATE 50 MCG: 50 INJECTION, SOLUTION INTRAMUSCULAR; INTRAVENOUS at 08:15

## 2017-02-12 RX ADMIN — FENTANYL CITRATE 50 MCG: 50 INJECTION, SOLUTION INTRAMUSCULAR; INTRAVENOUS at 08:20

## 2017-02-12 NOTE — ANESTHESIA PREPROCEDURE EVALUATION
Anesthesia Evaluation     Patient summary reviewed and Nursing notes reviewed   NPO Status: > 8 hours   Airway   Mallampati: II  TM distance: >3 FB  Neck ROM: full  possible difficult intubation  Dental    (+) poor dentation    Comment: Carious and poor repair.    Pulmonary - normal exam    breath sounds clear to auscultation  (+) a smoker (one pack/day) Current,   Cardiovascular - negative cardio ROS    Rhythm: regular  Rate: normal    (+) murmur (Grade II/VI),       Neuro/Psych- negative ROS  GI/Hepatic/Renal/Endo - negative ROS     Musculoskeletal (-) negative ROS    Abdominal    Substance History - negative use     OB/GYN      Comment: Pre menopausal bleeding;Hgb 4.5 on the 10th;this AM 12th Hgb 10.0 after transfusion.      Other - negative ROS                               Anesthesia Plan    ASA 3 - emergent     general     intravenous induction

## 2017-02-12 NOTE — OP NOTE
Date of Procedure: 02/12/2017    PROCEDURE PERFORMED: Total vaginal hysterectomy.    SURGEON: Tish Dan MD    ASSISTANT: Raj Rubalcava MD    ANESTHESIA: General.    ESTIMATED BLOOD LOSS: 200 mL.    SPECIMENS: Uterus and cervix.    PREOPERATIVE DIAGNOSES:   1. Menorrhagia.    2. Severe acute blood loss anemia.     POSTOPERATIVE DIAGNOSES:   1. Menorrhagia.    2. Severe acute blood loss anemia.     HISTORY: She presented to the emergency room with signs and symptoms of acute blood loss anemia. Her hemoglobin upon admission was 4.5. She was stabilized and given a blood transfusion and started on IV Premarin which improved her acute bleeding. We discussed the different options and she elected to proceed with definitive therapy with a hysterectomy. She had finished with childbearing and was status post tubal ligation.    DESCRIPTION OF PROCEDURE:  She was taken to the operating room with IV running. She was placed in dorsal supine position. Sequential compression devices were placed and functioning prior to the induction of anesthesia. She was then given general endotracheal anesthesia and placed in stirrups in the dorsal lithotomy position. She was prepped and draped in the usual sterile fashion. A speculum was placed in the vagina. The cervix was grasped with a tenaculum. The cervicovaginal junction was injected with 0.5% Sensorcaine with epinephrine. A scalpel was used to make an incision here. The vagina was dissected off the uterus and cervix posteriorly until I could visualize the peritoneum of the posterior cul-de-sac. This was entered sharply. The uterosacral ligaments were clamped bilaterally, transected, suture ligated with 0 Vicryl with excellent hemostasis. Cardinal ligaments were done similarly. We then turned our attention anteriorly. The vesicouterine peritoneal fold was entered sharply. Entry into the abdominal cavity was confirmed by the presence of bowel fat. The uterine vessels were  clamped bilaterally, transected, suture ligated with 0 Vicryl with excellent hemostasis. Serial clamps to the broad ligament were done as well. Once we were at the level of the round ligaments, the uterus was inverted posteriorly. The remaining round ligament-utero-ovarian ligaments were then clamped, transected, and the uterus was then delivered. Final pedicles were suture ligated with 0 Vicryl with excellent hemostasis. The fallopian tubes and ovaries appeared normal. There was a small simple-appearing cyst on the right ovary noted and we turned our attention to closure. The posterior cuff was closed with 0 Vicryl in a running locked fashion. The catheter was used to drain the bladder 300 mL of clear yellow urine. At this point, there appeared to be brisk bleeding from the right vaginal sidewall. We did visualize an arterial bleeder at this point. This was clamped with a right angle and suture ligated with 2-0 Vicryl with excellent hemostasis. This area was observed for another 20 minutes approximately. We saw no further bleeding from this side. The pedicles were visualized once again. Hemostasis was noted throughout. The posterior cuff was hemostatic and we turned our attention to closure. The vaginal cuff was closed in a running fashion using 0 Vicryl. Uterosacral ligaments were tied down in the midline. There were no immediate complications. She was awakened from general anesthesia and taken to the recovery room in stable condition.    CC:            Tish Dan MD  Dictation Date/Time: 02/12/2017 10:51:19(Eastern Time Zone)  Transcribed Date/Time: 02/12/2017 12:40:26 (Eastern Time Zone)  Dictator/ Initials:  SIMON/lauro  Document ID:                [id number]  Job ID: [id number]

## 2017-02-12 NOTE — ANESTHESIA POSTPROCEDURE EVALUATION
Patient: Fadia De Leon    Procedure Summary     Date Anesthesia Start Anesthesia Stop Room / Location    02/12/17 0714 [facility] OR 10 / [facility]       Procedure Diagnosis Surgeon Provider    VAGINAL HYSTERECTOMY with possible BSO (N/A Vagina) Acute blood loss anemia; Menorrhagia, premenopausal  (Acute blood loss anemia [D62]; Menorrhagia, premenopausal [N92.4]) MD Neil Valadez MD          Anesthesia Type: general  Last vitals  /80 (02/12/17 0908)    Temp 98.5 °F (36.9 °C) (02/12/17 0908)    Pulse 87 (02/12/17 0908)   Resp 18 (02/12/17 0908)    SpO2 98 % (02/12/17 0908)      Post Anesthesia Care and Evaluation    Patient location during evaluation: PACU  Patient participation: complete - patient participated  Level of consciousness: awake and alert  Pain score: 0  Pain management: adequate  Airway patency: patent  Anesthetic complications: No anesthetic complications  PONV Status: none  Cardiovascular status: acceptable  Respiratory status: acceptable  Hydration status: acceptable

## 2017-02-12 NOTE — PLAN OF CARE
Problem: Patient Care Overview (Adult)  Goal: Plan of Care Review  Outcome: Ongoing (interventions implemented as appropriate)  Goal: Adult Individualization and Mutuality  Outcome: Ongoing (interventions implemented as appropriate)  Goal: Discharge Needs Assessment  Outcome: Ongoing (interventions implemented as appropriate)    Problem: Fluid Volume Deficit (Adult)  Goal: Identify Related Risk Factors and Signs and Symptoms  Outcome: Ongoing (interventions implemented as appropriate)  Goal: Fluid/Electrolyte Balance  Outcome: Ongoing (interventions implemented as appropriate)  Goal: Comfort/Well Being  Outcome: Ongoing (interventions implemented as appropriate)

## 2017-02-12 NOTE — PLAN OF CARE
Problem: Patient Care Overview (Adult)  Goal: Plan of Care Review  Outcome: Ongoing (interventions implemented as appropriate)    02/12/17 0956   Coping/Psychosocial Response Interventions   Plan Of Care Reviewed With patient   Patient Care Overview   Progress improving   Outcome Evaluation   Outcome Summary/Follow up Plan patient stable; awake on arousal; very uncooperative; ready for transfer to [facility]         Problem: Perioperative Period (Adult)  Goal: Signs and Symptoms of Listed Potential Problems Will be Absent or Manageable (Perioperative Period)  Outcome: Ongoing (interventions implemented as appropriate)

## 2017-02-12 NOTE — PROGRESS NOTES
"  Subjective     Subjective  Patient reports:  She is doing well.  Her bleeding has nearly stopped.  She is ready for a hysterectomy and has no questions.    Objective  Physical Exam      Lungs: normal and clear to auscultation   Abdomen: abdomen is soft without significant tenderness, masses, organomegaly or guarding.   Extremities:  No edema         [unfilled]    Vital Sign Min/Max    Temp  Min: 97.8 °F (36.6 °C)  Max: 98.8 °F (37.1 °C)   Pulse  Min: 77  Max: 91   Resp  Min: 16  Max: 20   BP  Min: 116/83  Max: 163/87   No Data Recorded   SpO2  Min: 97 %  Max: 100 %   No Data Recorded     Flowsheet Rows         First Filed Value    Admission Height  62\" (157.5 cm) Documented at 02/10/2017 2001    Admission Weight  135 lb (61.2 kg) Documented at 02/10/2017 2001          Intake/Output last 24 hours:    Intake/Output Summary (Last 24 hours) at 02/12/17 0704  Last data filed at 02/11/17 1214   Gross per 24 hour   Intake    846 ml   Output      0 ml   Net    846 ml       Intake/Output this shift:       Labs/Studies reviewed:  Yes   Radiology studies reviewed:   Yes   Medications reviewed:   Yes     Assessment/Plan       total vaginal hysterectomy with possible BSO    Active Problems:    Acute blood loss anemia    Menorrhagia, premenopausal    Tobacco use        VAGINAL HYSTERECTOMY with possible BSO.                  Tish Dan MD  02/12/17  7:04 AM          "

## 2017-02-13 LAB
ABO + RH BLD: NORMAL
ABO + RH BLD: NORMAL
BH BB BLOOD EXPIRATION DATE: NORMAL
BH BB BLOOD EXPIRATION DATE: NORMAL
BH BB BLOOD TYPE BARCODE: 5100
BH BB BLOOD TYPE BARCODE: 5100
BH BB DISPENSE STATUS: NORMAL
BH BB DISPENSE STATUS: NORMAL
BH BB PRODUCT CODE: NORMAL
BH BB PRODUCT CODE: NORMAL
BH BB UNIT NUMBER: NORMAL
BH BB UNIT NUMBER: NORMAL
UNIT  ABO: NORMAL
UNIT  ABO: NORMAL
UNIT  RH: NORMAL
UNIT  RH: NORMAL

## 2017-02-13 NOTE — DISCHARGE SUMMARY
[facility]  Fadia De Leon  : 1975  MRN: [id number]  CSN: [id number]    Discharge Summary      Date of Admission: 2/10/2017   Date of Discharge: 2017   Discharge Diagnosis: 1. Acute blood loss anemia, Menorrhagia   Procedures Performed: Procedure(s):  VAGINAL HYSTERECTOMY with possible BSO      Consults: None   Brief History: Patient is a 41 y.o.who presented with a hemoglobin of 4.5 and heavy menstrual bleeding.   Hospital Course: She was transfused 4 units and given IV premarin.  She then underwent a TVH which was uncomplicated and she was discharged home on POD#0   Pending Studies: None   Condition at discharge: stable   Discharge Medications:    Your medication list       As of 2017  5:14 PM      START taking these medications       Instructions Last Dose Given Next Dose Due    HYDROcodone-acetaminophen 7.5-325 MG per tablet   Commonly known as:  NORCO        Take 1 tablet by mouth Every 4 (Four) Hours As Needed for moderate pain (4-6) for up to 10 days.         hydrOXYzine 50 MG capsule   Commonly known as:  VISTARIL        Take 1 capsule by mouth Every 6 (Six) Hours As Needed for anxiety.           CONTINUE taking these medications       Instructions Last Dose Given Next Dose Due    ferrous sulfate 324 (65 FE) MG tablet delayed-release EC tablet                   Where to Get Your Medications      These medications were sent to [facility] - [facility] - [address] - [phone] [facility] [phone] FX  [address] [place] [address]     Phone:  [phone]    • hydrOXYzine 50 MG capsule         You can get these medications from any pharmacy     Bring a paper prescription for each of these medications    • HYDROcodone-acetaminophen 7.5-325 MG per tablet            Discharge Disposition: home   Follow-up: Follow up later this week for a postoperative visit.         This note has been electronically signed.    Tish Dan MD  2017  7:09 PM

## 2017-02-14 LAB
LAB AP CASE REPORT: NORMAL
Lab: NORMAL
PATH REPORT.FINAL DX SPEC: NORMAL
PATH REPORT.GROSS SPEC: NORMAL

## 2021-05-31 ENCOUNTER — APPOINTMENT (OUTPATIENT)
Dept: GENERAL RADIOLOGY | Facility: HOSPITAL | Age: 46
End: 2021-05-31

## 2021-05-31 ENCOUNTER — HOSPITAL ENCOUNTER (EMERGENCY)
Facility: HOSPITAL | Age: 46
Discharge: HOME | End: 2021-05-31
Attending: EMERGENCY MEDICINE | Admitting: EMERGENCY MEDICINE

## 2021-05-31 VITALS
HEART RATE: 78 BPM | BODY MASS INDEX: 31.28 KG/M2 | DIASTOLIC BLOOD PRESSURE: 89 MMHG | SYSTOLIC BLOOD PRESSURE: 142 MMHG | RESPIRATION RATE: 16 BRPM | HEIGHT: 62 IN | OXYGEN SATURATION: 100 % | TEMPERATURE: 97.7 F | WEIGHT: 170 LBS

## 2021-05-31 DIAGNOSIS — I10 ESSENTIAL HYPERTENSION: Primary | ICD-10-CM

## 2021-05-31 LAB
ALBUMIN SERPL-MCNC: 4.9 G/DL (ref 3.5–5.2)
ALBUMIN/GLOB SERPL: 1.5 G/DL
ALP SERPL-CCNC: 100 U/L (ref 39–117)
ALT SERPL W P-5'-P-CCNC: 8 U/L (ref 1–33)
ANION GAP SERPL CALCULATED.3IONS-SCNC: 9 MMOL/L (ref 5–15)
AST SERPL-CCNC: 21 U/L (ref 1–32)
BILIRUB SERPL-MCNC: 0.5 MG/DL (ref 0–1.2)
BUN SERPL-MCNC: 7 MG/DL (ref 6–20)
BUN/CREAT SERPL: 8 (ref 7–25)
CALCIUM SPEC-SCNC: 9.9 MG/DL (ref 8.6–10.5)
CHLORIDE SERPL-SCNC: 102 MMOL/L (ref 98–107)
CO2 SERPL-SCNC: 28 MMOL/L (ref 22–29)
CREAT SERPL-MCNC: 0.87 MG/DL (ref 0.57–1)
GFR SERPL CREATININE-BSD FRML MDRD: 71 ML/MIN/1.73
GLOBULIN UR ELPH-MCNC: 3.3 GM/DL
GLUCOSE SERPL-MCNC: 95 MG/DL (ref 65–99)
HOLD SPECIMEN: NORMAL
POTASSIUM SERPL-SCNC: 3.4 MMOL/L (ref 3.5–5.2)
PROT SERPL-MCNC: 8.2 G/DL (ref 6–8.5)
SODIUM SERPL-SCNC: 139 MMOL/L (ref 136–145)
WHOLE BLOOD HOLD SPECIMEN: NORMAL

## 2021-05-31 PROCEDURE — 93010 ELECTROCARDIOGRAM REPORT: CPT | Performed by: INTERNAL MEDICINE

## 2021-05-31 PROCEDURE — 80053 COMPREHEN METABOLIC PANEL: CPT | Performed by: STUDENT IN AN ORGANIZED HEALTH CARE EDUCATION/TRAINING PROGRAM

## 2021-05-31 PROCEDURE — 25010000002 HYDRALAZINE PER 20 MG: Performed by: STUDENT IN AN ORGANIZED HEALTH CARE EDUCATION/TRAINING PROGRAM

## 2021-05-31 PROCEDURE — 93005 ELECTROCARDIOGRAM TRACING: CPT | Performed by: EMERGENCY MEDICINE

## 2021-05-31 PROCEDURE — 93005 ELECTROCARDIOGRAM TRACING: CPT | Performed by: STUDENT IN AN ORGANIZED HEALTH CARE EDUCATION/TRAINING PROGRAM

## 2021-05-31 PROCEDURE — 71045 X-RAY EXAM CHEST 1 VIEW: CPT

## 2021-05-31 PROCEDURE — 99284 EMERGENCY DEPT VISIT MOD MDM: CPT

## 2021-05-31 PROCEDURE — 96374 THER/PROPH/DIAG INJ IV PUSH: CPT

## 2021-05-31 RX ORDER — VENLAFAXINE HYDROCHLORIDE 150 MG/1
225 CAPSULE, EXTENDED RELEASE ORAL DAILY
COMMUNITY

## 2021-05-31 RX ORDER — HYDRALAZINE HYDROCHLORIDE 20 MG/ML
20 INJECTION INTRAMUSCULAR; INTRAVENOUS ONCE
Status: COMPLETED | OUTPATIENT
Start: 2021-05-31 | End: 2021-05-31

## 2021-05-31 RX ORDER — BUSPIRONE HYDROCHLORIDE 10 MG/1
10 TABLET ORAL 3 TIMES DAILY
COMMUNITY

## 2021-05-31 RX ORDER — POTASSIUM CHLORIDE 1.5 G/1.77G
40 POWDER, FOR SOLUTION ORAL ONCE
Status: COMPLETED | OUTPATIENT
Start: 2021-05-31 | End: 2021-05-31

## 2021-05-31 RX ORDER — HYDROCHLOROTHIAZIDE 12.5 MG/1
12.5 CAPSULE, GELATIN COATED ORAL DAILY
COMMUNITY

## 2021-05-31 RX ORDER — LOSARTAN POTASSIUM 50 MG/1
100 TABLET ORAL DAILY
COMMUNITY

## 2021-05-31 RX ORDER — LABETALOL HYDROCHLORIDE 5 MG/ML
20 INJECTION, SOLUTION INTRAVENOUS ONCE
Status: DISCONTINUED | OUTPATIENT
Start: 2021-05-31 | End: 2021-05-31

## 2021-05-31 RX ORDER — HYDRALAZINE HYDROCHLORIDE 25 MG/1
25 TABLET, FILM COATED ORAL 2 TIMES DAILY PRN
Qty: 8 TABLET | Refills: 0 | Status: SHIPPED | OUTPATIENT
Start: 2021-05-31

## 2021-05-31 RX ORDER — LABETALOL HYDROCHLORIDE 5 MG/ML
40 INJECTION, SOLUTION INTRAVENOUS ONCE
Status: DISCONTINUED | OUTPATIENT
Start: 2021-05-31 | End: 2021-05-31 | Stop reason: HOSPADM

## 2021-05-31 RX ADMIN — HYDRALAZINE HYDROCHLORIDE 20 MG: 20 INJECTION INTRAMUSCULAR; INTRAVENOUS at 17:45

## 2021-05-31 RX ADMIN — POTASSIUM CHLORIDE 40 MEQ: 1.5 POWDER, FOR SOLUTION ORAL at 18:37

## 2021-06-01 NOTE — DISCHARGE INSTRUCTIONS
Return for acutely elevated blood pressure with symptoms of end-organ damage such as neurological symptoms or chest pain

## 2021-07-12 LAB
QT INTERVAL: 378 MS
QTC INTERVAL: 454 MS

## (undated) DEVICE — GLV SURG TRIUMPH ORTHO W/ALOE PF LTX 6.5 STRL

## (undated) DEVICE — TUBING, SUCTION, 3/16" X 6', STRAIGHT: Brand: MEDLINE

## (undated) DEVICE — GLV SURG SENSICARE GREEN W/ALOE PF LF 6.5 STRL

## (undated) DEVICE — TOTAL TRAY, 16FR 10ML SIL FOLEY, URN: Brand: MEDLINE

## (undated) DEVICE — GLV SURG SENSICARE GREEN W/ALOE PF LF 7 STRL

## (undated) DEVICE — GAUZE,SPONGE,4"X4",16PLY,XRAY,STRL,LF: Brand: MEDLINE

## (undated) DEVICE — SOL IRR NACL 0.9PCT BT 1000ML

## (undated) DEVICE — SUT VICRYL 0 TIES J112T

## (undated) DEVICE — MAD MAJOR LITHOTOMY-LF: Brand: MEDLINE INDUSTRIES, INC.

## (undated) DEVICE — SUT PDS 0 CT-1 Z340H

## (undated) DEVICE — SUT VICRYL O M0-4 27IN ETHCPP71D

## (undated) DEVICE — INTENDED FOR TISSUE SEPARATION, AND OTHER PROCEDURES THAT REQUIRE A SHARP SURGICAL BLADE TO PUNCTURE OR CUT.: Brand: BARD-PARKER ® CARBON RIB-BACK BLADES

## (undated) DEVICE — SUT PDS 0 CT2 27IN DYED Z334H

## (undated) DEVICE — SUT VIC 0 CT1 36IN J946H

## (undated) DEVICE — UNDRPD BREATH 23X36

## (undated) DEVICE — SOL IRR NACL 0.9PCT 3000ML

## (undated) DEVICE — GLV SURG TRIUMPH ORTHO W/ALOE PF LTX 8 STRL

## (undated) DEVICE — PAD,ABDOMINAL,8"X10",ST,LF: Brand: MEDLINE

## (undated) DEVICE — SUT VIC 2/0 CT1 36IN

## (undated) DEVICE — GLV SURG TRIUMPH LT PF LTX 6.5 STRL